# Patient Record
Sex: MALE | URBAN - METROPOLITAN AREA
[De-identification: names, ages, dates, MRNs, and addresses within clinical notes are randomized per-mention and may not be internally consistent; named-entity substitution may affect disease eponyms.]

---

## 2023-07-03 ENCOUNTER — OFFICE VISIT (OUTPATIENT)
Age: 23
End: 2023-07-03
Payer: COMMERCIAL

## 2023-07-03 VITALS
HEIGHT: 68 IN | WEIGHT: 138 LBS | OXYGEN SATURATION: 99 % | RESPIRATION RATE: 18 BRPM | DIASTOLIC BLOOD PRESSURE: 67 MMHG | TEMPERATURE: 98 F | SYSTOLIC BLOOD PRESSURE: 105 MMHG | HEART RATE: 57 BPM | BODY MASS INDEX: 20.92 KG/M2

## 2023-07-03 DIAGNOSIS — K64.1 GRADE II HEMORRHOIDS: Primary | ICD-10-CM

## 2023-07-03 PROCEDURE — 99203 OFFICE O/P NEW LOW 30 MIN: CPT | Performed by: COLON & RECTAL SURGERY

## 2023-07-03 PROCEDURE — 46600 DIAGNOSTIC ANOSCOPY SPX: CPT | Performed by: COLON & RECTAL SURGERY

## 2023-07-03 PROCEDURE — G8420 CALC BMI NORM PARAMETERS: HCPCS | Performed by: COLON & RECTAL SURGERY

## 2023-07-03 PROCEDURE — 1036F TOBACCO NON-USER: CPT | Performed by: COLON & RECTAL SURGERY

## 2023-07-03 PROCEDURE — G8427 DOCREV CUR MEDS BY ELIG CLIN: HCPCS | Performed by: COLON & RECTAL SURGERY

## 2023-08-31 ENCOUNTER — OFFICE VISIT (OUTPATIENT)
Age: 23
End: 2023-08-31
Payer: COMMERCIAL

## 2023-08-31 VITALS
HEIGHT: 69 IN | OXYGEN SATURATION: 100 % | RESPIRATION RATE: 18 BRPM | TEMPERATURE: 97.8 F | SYSTOLIC BLOOD PRESSURE: 110 MMHG | DIASTOLIC BLOOD PRESSURE: 67 MMHG | BODY MASS INDEX: 20.29 KG/M2 | WEIGHT: 137 LBS | HEART RATE: 65 BPM

## 2023-08-31 DIAGNOSIS — K64.1 GRADE II HEMORRHOIDS: Primary | ICD-10-CM

## 2023-08-31 PROCEDURE — 99213 OFFICE O/P EST LOW 20 MIN: CPT | Performed by: COLON & RECTAL SURGERY

## 2023-08-31 NOTE — PROGRESS NOTES
Subjective: Continues to have discomfort    Past medical history and ROS were reviewed and unchanged. Rectum: mildly enlarged external hemorrhoids  YADIRA deferred    Assessment / Plan    Hemorrhoids  Would like to try conservative measures  Reviewed dietary modification, fiber supplement  Perianal hygiene recommendations reviewed  F/U should he decide to proceed with surgery    20 minutes was spent in patient care. The diagnoses and plan were discussed with patient. All questions answered. Plan of care agreed to by all concerned.

## 2023-08-31 NOTE — PATIENT INSTRUCTIONS
Discontinue use of all wipes  Can use Balneol (or generic equivalent) on toilet paper for softer texture  Bay, lotion-based soaps (e.g. Dove) to the perianal area when bathing  Avoid excessive scrubbing, 1-2 swipes then rinse off  Bay topical lotions may be helpful  Desitin, A&D ointment or generic zinc oxide  Metamucil or Citrucel fiber powder once a day  High fiber diet  Call to schedule hemorrhoid surgery if within a month or so, if 6 months of longer you will need to see Dr. Olga Watkins in office for evaluation